# Patient Record
(demographics unavailable — no encounter records)

---

## 2024-10-07 NOTE — HISTORY OF PRESENT ILLNESS
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  See note from when she was seen in the office less than 3 months ago.  She deferred a cortisone injection.  She has been treated with occupational therapy.  She returns today for left thumb and hand pain that began after a fall about 6 weeks ago. She reports swelling. She rates her pain as a 7/10 which worsens with movement. She also needs an updated prescription for hand therapy.

## 2024-10-07 NOTE — PHYSICAL EXAM
[de-identified] : - Constitutional: This is a female in no obvious distress.   - Psych: Patient is alert and oriented to person, place and time.  Patient has a normal mood and affect.  ---   Examination of her left thumb demonstrates swelling along the palmar aspect of the CMC joint and the soft tissues.  There is some tenderness.  There is no crepitus.  She is neurovascularly intact distally. [de-identified] : AP, lateral, and oblique radiographs of the left hand demonstrate no obvious fractures or dislocations, mild to moderate degenerative changes at the CMC joint.   PA, lateral, and oblique radiographs of the left wrist and hand dated 7/1/2024 demonstrated mild degenerative changes at the CMC joint of the thumb  PA, lateral, and oblique radiographs of the right wrist and hand dated 11/13/2023 demonstrated mild mild narrowing of the CMC joint of the thumb. No arthritis in the other regions of the hand or wrist.  Previous PA, lateral and PA  views of her left wrist and hand demonstrated no arthritis.  She has neutral ulnar variance.  An MRI of her right wrist dated 2/12/2024 demonstrated: Degenerative changes throughout the TFCC, with suspected degenerative tear at the peripheral styloid attachment.  Mild to moderate degenerative changes at the DRUJ with small effusion.  Mild ECU tendinosis.  An MRI of her left wrist from 12/8/2020 demonstrated moderate tendinopathy and longitudinal split tearing of the extensor carpi ulnaris tendon with moderate tenosynovitis. Mild to moderate tendinopathy and longitudinal split tearing of the extensor pollicis brevis and abductor pollicis longus tendons. Degenerative tear of the central disc of the triangular fibrocartilage complex. Mild chondromalacia and marginal osteophytosis of the scaphotrapezoidal and first carpometacarpal joint spaces. Moderate-sized distal radioulnar joint effusion and mild effusion of the midcarpal and radiocarpal joint spaces.

## 2024-10-07 NOTE — DISCUSSION/SUMMARY
[FreeTextEntry1] : She has findings consistent with persistent left thumb swelling and mild pain along the palmar aspect of the CMC joint, status post the injury 6 weeks ago.  I had a discussion regarding today's visit, the diagnosis and treatment recommendations and options.  We also discussed changes since the last visit.  At this time, I recommended continued observation and hand therapy as long as it is helping her. An updated prescription for hand therapy was also provided today. She will follow-up as needed.  If her symptoms do not improve at the left thumb, then I would consider a cortisone injection in the future.  The patient has agreed to the above plan of management and has expressed full understanding.  All questions were fully answered to the patient's satisfaction.  My cumulative time spent on today's visit was greater than 30 minutes and included: Preparation for the visit, review of the medical records, review of pertinent diagnostic studies, examination and counseling of the patient on the above diagnosis, treatment plan and prognosis, orders of diagnostic tests, medications and/or appropriate procedures and documentation in the medical records of today's visit.

## 2024-10-07 NOTE — ADDENDUM
[FreeTextEntry1] :  I, Michael Baker, acted solely as a scribe for Dr. Roa on this date on 10/07/2024.

## 2024-10-07 NOTE — END OF VISIT
[FreeTextEntry3] : This note was written by Michael Baker on 10/07/2024 acting solely as a scribe for Dr. Paulo Roa.   All medical record entries made by the Scribe were at my, Dr. Paulo Roa, direction and personally dictated by me on 10/07/2024. I have personally reviewed the chart and agree that the record accurately reflects my personal performance of the history, physical exam, assessment and plan.

## 2024-10-07 NOTE — PHYSICAL EXAM
[de-identified] : - Constitutional: This is a female in no obvious distress.   - Psych: Patient is alert and oriented to person, place and time.  Patient has a normal mood and affect.  ---   Examination of her left thumb demonstrates swelling along the palmar aspect of the CMC joint and the soft tissues.  There is some tenderness.  There is no crepitus.  She is neurovascularly intact distally. [de-identified] : AP, lateral, and oblique radiographs of the left hand demonstrate no obvious fractures or dislocations, mild to moderate degenerative changes at the CMC joint.   PA, lateral, and oblique radiographs of the left wrist and hand dated 7/1/2024 demonstrated mild degenerative changes at the CMC joint of the thumb  PA, lateral, and oblique radiographs of the right wrist and hand dated 11/13/2023 demonstrated mild mild narrowing of the CMC joint of the thumb. No arthritis in the other regions of the hand or wrist.  Previous PA, lateral and PA  views of her left wrist and hand demonstrated no arthritis.  She has neutral ulnar variance.  An MRI of her right wrist dated 2/12/2024 demonstrated: Degenerative changes throughout the TFCC, with suspected degenerative tear at the peripheral styloid attachment.  Mild to moderate degenerative changes at the DRUJ with small effusion.  Mild ECU tendinosis.  An MRI of her left wrist from 12/8/2020 demonstrated moderate tendinopathy and longitudinal split tearing of the extensor carpi ulnaris tendon with moderate tenosynovitis. Mild to moderate tendinopathy and longitudinal split tearing of the extensor pollicis brevis and abductor pollicis longus tendons. Degenerative tear of the central disc of the triangular fibrocartilage complex. Mild chondromalacia and marginal osteophytosis of the scaphotrapezoidal and first carpometacarpal joint spaces. Moderate-sized distal radioulnar joint effusion and mild effusion of the midcarpal and radiocarpal joint spaces.

## 2024-11-22 NOTE — HISTORY OF PRESENT ILLNESS
[Worsening] : worsening [___ mths] : [unfilled] month(s) ago [1] : a current pain level of 1/10 [0] : a minimum pain level of 0/10 [10] : a maximum pain level of 10/10 [Walking] : worsened by walking [Weight Bearing] : worsened by weight bearing [Physical Therapy] : relieved by physical therapy [Rest] : relieved by rest [Ataxia] : ataxia [Loss of Dexterity] : loss of dexterity [de-identified] : Very pleasant woman presents for evaluation of lower extremity weakness and fatigue she states she notices onset in August states she was on a 23 mile hike and and towards the end was unable to ambulate this lasted for approximately 12 hours.  In a day after extremely fatigued and ever since then she notices very early onset leg fatigue off balance weakness excetra.  Based upon progressive deficits with strength off balance MD minus a KRYSTIAN questionnaire that is positive and concern for myelopathy since August she has been under the care of her physical therapist they are noticing progressive weakness in her legs failure to recovery from training and physical therapy she was sent here for further evaluation for lumbar and/or cervical stenosis [Incontinence] : no incontinence [Urinary Ret.] : no urinary retention

## 2024-11-22 NOTE — DISCUSSION/SUMMARY
[de-identified] : Patient recently did a 23 mile hike what appears to have severe muscle fatigue.  However she is not recovering and this was back in August.  She states she has severe leg weakness off balance and I am concern for myelopathy.  She has been evaluated by her physical therapist previous orthopedics and she is sent to spine surgery for further workup for severe lumbar stenosis with regard to lower extremity weakness offPossible cervical stenosis balance.  Again concern for myelopathy based upon HPI physical exam excetra.  Patient has been in physical therapy since August with failure to improve with regard to leg strength off balance early fatigue excetra.  At this time the patient has exhausted conservative treatment including physical therapy and home exercises as guided by our office. Home exercises have been ongoing for the last 4-6 weeks, consisting of exercises performed for 30 min per day, for 4-5 days per week. These exercises have included cervical spine stretches, isometric strengthening exercises, chin tucks, shoulder circles, shoulder-blade squeezes, etc. which have failed to provide any significant relief in symptoms. They have also failed oral medications including NSAIDs/Tylenol, Medrol dosepacks, etc. again with failure to improve symptoms. At this time, an MRI of the cervical spine is ordered and medically necessary to evaluate for discogenic pathology that may be amendable to injection therapies with pain management vs. necessitate operative management. Patient will follow up after MRI to review results and discuss treatment options.   11/22/2024: Addendum-based upon the patient's positive KRYSTIAN status of gait alteration, falls, worsening dexterity/penmanship, abnormal reflexes and/or hyperreflexia as reflected in the HPI and physical exam an MRI of the cervical and thoracic spine is being ordered. MRI of the cervical and thoracic spine is ordered and medically necessary to evaluate for myelopathy that may necessitate a semi-urgent decompression to preserve neurological function and arrest neurological deficits. The patient will follow up ASAP after MRI to review results and discussion treatment options.  MRI of the lumbar spine is ordered to rule out spinal stenosis/neurogenic claudication as an alternative cause for lower extremity weakness/fatigue.  At this time the patient has exhausted conservative treatment including physical therapy and home exercises as guided by our office. Home exercises have been ongoing for the last 4-6 weeks, consisting of exercises performed for 30 min per day, for 4-5 days per week. These exercises have included posterior pelvic tilts, transverse abdominus presses with and without hip flexion/heel taps, dead bug, bird dog, plank, side plank, cat/camel, curl up, hip rolls, bridges, hamstring stretches, wall slides, press ups, etc. which have failed to provide any significant relief in symptoms. They have also failed oral medications including NSAIDs/Tylenol, Medrol dosepacks, etc. again with failure to improve symptoms. At this time, an MRI of the lumbar spine is ordered and medically necessary to evaluate for discogenic pathology that may be amendable to injection therapies with pain management vs. necessitate operative management. Patient will follow up after MRI to review results and discuss treatment options.

## 2024-11-22 NOTE — PHYSICAL EXAM
[Antalgic] : antalgic [Wide-Based] : wide-based [de-identified] : CONSTITUTIONAL: The patient is a very pleasant individual who is well-nourished and who appears stated age. PSYCHIATRIC: The patient is alert and oriented X 3 and in no apparent distress, and participates with orthopedic evaluation well. HEAD: Atraumatic and is nonsyndromic in appearance. EENT: No visible thyromegaly, EOMI. RESPIRATORY: Respiratory rate is regular, not dyspneic on examination. LYMPHATICS: There is no inguinal lymphadenopathy INTEGUMENTARY: Skin is clean, dry, and intact about the bilateral lower extremities and lumbar spine. VASCULAR: There is brisk capillary refill about the bilateral lower extremities. NEUROLOGIC: There are no pathologic reflexes. There is a decrease in sensation of the bilateral lower extremities on manual  examination. Deep tendon reflexes are well maintained at 2+/4 of the bilateral lower extremities and are symmetric.. MUSCULOSKELETAL: There is no visible muscular atrophy. Manual motor strength is diminished in the bilateral lower extremities,  subjectively patient states her motor strength is much diminished at approximately a 4/5. Range of motion of lumbar spine is well maintained guarded. The patient ambulates in a non-myelopathic manner. Negative tension sign and straight leg raise bilaterally. Quad extension, ankle dorsiflexion, EHL, plantar flexion, and ankle eversion are well preserved. Normal secondary orthopaedic exam of bilateral hips, greater trochanteric area, knees and ankles [de-identified] : Previous lumbar x-rays have been reviewed that shows a transitional segment significant calcified uterine fibroma.  Lumbar spondylosis is noted.  Previous MRI is also been reviewed the lumbar spine from 2021 showing lateral disc herniation on the left at L3-4 mild to moderate stenosis at 4 5.  Hip MRIs previously also been reviewed showing gluteal tendinopathy

## 2024-11-22 NOTE — DISCUSSION/SUMMARY
[de-identified] : Patient recently did a 23 mile hike what appears to have severe muscle fatigue.  However she is not recovering and this was back in August.  She states she has severe leg weakness off balance and I am concern for myelopathy.  She has been evaluated by her physical therapist previous orthopedics and she is sent to spine surgery for further workup for severe lumbar stenosis with regard to lower extremity weakness offPossible cervical stenosis balance.  Again concern for myelopathy based upon HPI physical exam excetra.  Patient has been in physical therapy since August with failure to improve with regard to leg strength off balance early fatigue excetra.  At this time the patient has exhausted conservative treatment including physical therapy and home exercises as guided by our office. Home exercises have been ongoing for the last 4-6 weeks, consisting of exercises performed for 30 min per day, for 4-5 days per week. These exercises have included cervical spine stretches, isometric strengthening exercises, chin tucks, shoulder circles, shoulder-blade squeezes, etc. which have failed to provide any significant relief in symptoms. They have also failed oral medications including NSAIDs/Tylenol, Medrol dosepacks, etc. again with failure to improve symptoms. At this time, an MRI of the cervical spine is ordered and medically necessary to evaluate for discogenic pathology that may be amendable to injection therapies with pain management vs. necessitate operative management. Patient will follow up after MRI to review results and discuss treatment options.   11/22/2024: Addendum-based upon the patient's positive KRYSTIAN status of gait alteration, falls, worsening dexterity/penmanship, abnormal reflexes and/or hyperreflexia as reflected in the HPI and physical exam an MRI of the cervical and thoracic spine is being ordered. MRI of the cervical and thoracic spine is ordered and medically necessary to evaluate for myelopathy that may necessitate a semi-urgent decompression to preserve neurological function and arrest neurological deficits. The patient will follow up ASAP after MRI to review results and discussion treatment options.  MRI of the lumbar spine is ordered to rule out spinal stenosis/neurogenic claudication as an alternative cause for lower extremity weakness/fatigue.  At this time the patient has exhausted conservative treatment including physical therapy and home exercises as guided by our office. Home exercises have been ongoing for the last 4-6 weeks, consisting of exercises performed for 30 min per day, for 4-5 days per week. These exercises have included posterior pelvic tilts, transverse abdominus presses with and without hip flexion/heel taps, dead bug, bird dog, plank, side plank, cat/camel, curl up, hip rolls, bridges, hamstring stretches, wall slides, press ups, etc. which have failed to provide any significant relief in symptoms. They have also failed oral medications including NSAIDs/Tylenol, Medrol dosepacks, etc. again with failure to improve symptoms. At this time, an MRI of the lumbar spine is ordered and medically necessary to evaluate for discogenic pathology that may be amendable to injection therapies with pain management vs. necessitate operative management. Patient will follow up after MRI to review results and discuss treatment options.

## 2024-11-22 NOTE — PHYSICAL EXAM
[Antalgic] : antalgic [Wide-Based] : wide-based [de-identified] : CONSTITUTIONAL: The patient is a very pleasant individual who is well-nourished and who appears stated age. PSYCHIATRIC: The patient is alert and oriented X 3 and in no apparent distress, and participates with orthopedic evaluation well. HEAD: Atraumatic and is nonsyndromic in appearance. EENT: No visible thyromegaly, EOMI. RESPIRATORY: Respiratory rate is regular, not dyspneic on examination. LYMPHATICS: There is no inguinal lymphadenopathy INTEGUMENTARY: Skin is clean, dry, and intact about the bilateral lower extremities and lumbar spine. VASCULAR: There is brisk capillary refill about the bilateral lower extremities. NEUROLOGIC: There are no pathologic reflexes. There is a decrease in sensation of the bilateral lower extremities on manual  examination. Deep tendon reflexes are well maintained at 2+/4 of the bilateral lower extremities and are symmetric.. MUSCULOSKELETAL: There is no visible muscular atrophy. Manual motor strength is diminished in the bilateral lower extremities,  subjectively patient states her motor strength is much diminished at approximately a 4/5. Range of motion of lumbar spine is well maintained guarded. The patient ambulates in a non-myelopathic manner. Negative tension sign and straight leg raise bilaterally. Quad extension, ankle dorsiflexion, EHL, plantar flexion, and ankle eversion are well preserved. Normal secondary orthopaedic exam of bilateral hips, greater trochanteric area, knees and ankles [de-identified] : Previous lumbar x-rays have been reviewed that shows a transitional segment significant calcified uterine fibroma.  Lumbar spondylosis is noted.  Previous MRI is also been reviewed the lumbar spine from 2021 showing lateral disc herniation on the left at L3-4 mild to moderate stenosis at 4 5.  Hip MRIs previously also been reviewed showing gluteal tendinopathy

## 2024-11-22 NOTE — HISTORY OF PRESENT ILLNESS
[Worsening] : worsening [___ mths] : [unfilled] month(s) ago [1] : a current pain level of 1/10 [0] : a minimum pain level of 0/10 [10] : a maximum pain level of 10/10 [Walking] : worsened by walking [Weight Bearing] : worsened by weight bearing [Physical Therapy] : relieved by physical therapy [Rest] : relieved by rest [Ataxia] : ataxia [Loss of Dexterity] : loss of dexterity [de-identified] : Very pleasant woman presents for evaluation of lower extremity weakness and fatigue she states she notices onset in August states she was on a 23 mile hike and and towards the end was unable to ambulate this lasted for approximately 12 hours.  In a day after extremely fatigued and ever since then she notices very early onset leg fatigue off balance weakness excetra.  Based upon progressive deficits with strength off balance MD minus a KRYSTIAN questionnaire that is positive and concern for myelopathy since August she has been under the care of her physical therapist they are noticing progressive weakness in her legs failure to recovery from training and physical therapy she was sent here for further evaluation for lumbar and/or cervical stenosis [Incontinence] : no incontinence [Urinary Ret.] : no urinary retention

## 2024-12-26 NOTE — HISTORY OF PRESENT ILLNESS
[Stable] : stable [___ yrs] : [unfilled] year(s) ago [0] : a minimum pain level of 0/10 [5] : a maximum pain level of 5/10 [Intermit.] : ~He/She~ states the symptoms seem to be intermittent [Walking] : worsened by walking [Heat] : relieved by heat [Physical Therapy] : relieved by physical therapy [Rest] : relieved by rest [de-identified] : Patient presents for review of her cervical MRI.  She is also under the care of of neurology.  Neurology has MRIs pending thoracic and lumbar have been denied from her insurance carrier despite a request for workup with regard to rule out myelopathy cervical MRI is complete in summary demonstrating cervical spondylosis at 3 levels of 4556 and 6 7 [Ataxia] : no ataxia [Incontinence] : no incontinence [Loss of Dexterity] : good dexterity [Urinary Ret.] : no urinary retention

## 2024-12-26 NOTE — PHYSICAL EXAM
[Antalgic] : antalgic [de-identified] : CONSTITUTIONAL: The patient is a very pleasant individual who is well-nourished and who appears stated age. PSYCHIATRIC: The patient is alert and oriented X 3 and in no apparent distress, and participates with orthopedic evaluation well. HEAD: Atraumatic and is nonsyndromic in appearance. EENT: No visible thyromegaly, EOMI. RESPIRATORY: Respiratory rate is regular, not dyspneic on examination. LYMPHATICS: There is no inguinal lymphadenopathy INTEGUMENTARY: Skin is clean, dry, and intact about the bilateral lower extremities and lumbar spine. VASCULAR: There is brisk capillary refill about the bilateral lower extremities. NEUROLOGIC: There are no pathologic reflexes. There is no decrease in sensation of the bilateral lower extremities on manual  examination. Deep tendon reflexes are well maintained at 2+/4 of the bilateral lower extremities and are symmetric.. MUSCULOSKELETAL: There is no visible muscular atrophy. Manual motor strength is well maintained in the bilateral lower extremities. Range of motion of lumbar spine is well maintained. The patient ambulates in a non-myelopathic manner. Negative tension sign and straight leg raise bilaterally. Quad extension, ankle dorsiflexion, EHL, plantar flexion, and ankle eversion are well preserved. Normal secondary orthopaedic exam of bilateral hips, greater trochanteric area, knees and ankles, highlighted by posterior cervical myositis and lumbar myositis [de-identified] : MRI of the cervical spine is demonstrating 4556 and 6 7 cervical spondylosis.  Pelvic MRI/right hip MRIs demonstrating tendinitis/tendinosis.  Previous lumbar MRI from 2021 has been reviewed showing transitional segment possibly an L4-L5 lumbar degenerative disc disease no significant central lateral recess stenosis

## 2024-12-26 NOTE — DISCUSSION/SUMMARY
[de-identified] : Under the care of neurology/[facility] they stated getting a lumbar & ThoracicMRI.  Our previous request for MRIs been denied for thoracic and lumbar for surgical indications I be happy to see this patient back for further discussion if surgical indications arise after neurology obtained thoracic and lumbar MRIs.  As of right now if cervical spondylosis is noted she has no myelopathy no focal radiculopathy in the cervical spine follow-up on a as needed basis

## 2025-01-30 NOTE — PHYSICAL EXAM
[Antalgic] : antalgic [de-identified] : CONSTITUTIONAL: The patient is a very pleasant individual who is well-nourished and who appears stated age. PSYCHIATRIC: The patient is alert and oriented X 3 and in no apparent distress, and participates with orthopedic evaluation well. HEAD: Atraumatic and is nonsyndromic in appearance. EENT: No visible thyromegaly, EOMI. RESPIRATORY: Respiratory rate is regular, not dyspneic on examination. LYMPHATICS: There is no inguinal lymphadenopathy INTEGUMENTARY: Skin is clean, dry, and intact about the bilateral lower extremities and lumbar spine. VASCULAR: There is brisk capillary refill about the bilateral lower extremities. NEUROLOGIC: There are no pathologic reflexes. There is no decrease in sensation of the bilateral lower extremities on manual  examination. Deep tendon reflexes are well maintained at 2+/4 of the bilateral lower extremities and are symmetric.. MUSCULOSKELETAL: There is no visible muscular atrophy. Manual motor strength is well maintained in the bilateral lower extremities. Range of motion of lumbar spine is well maintained. The patient ambulates in a non-myelopathic manner. Negative tension sign and straight leg raise bilaterally. Quad extension, ankle dorsiflexion, EHL, plantar flexion, and ankle eversion are well preserved. Normal secondary orthopaedic exam of bilateral hips, greater trochanteric area, knees and ankles, multiple complaints of right sided IT band type pathology complaints of fatigue in her legs glue medius excetra [de-identified] : MRI of the lumbar spine has been [facility] this is dated January 23, 2025 overall minimal lumbar spondylosis adjacent segment disease is noted retrolisthesis L5-S1 no significant central lateral recess stenosis there is L5-S1 lumbar degenerative disc disease.  Thoracic MRI is currently pending and being ordered by her neurologist

## 2025-01-30 NOTE — HISTORY OF PRESENT ILLNESS
[de-identified] : Damon woman presents for evaluation of this complaint of weakness that occurred over the summer.  She states this occurred after climbing multiple peaks/mountains 20+ miles.  She is currently being guided and treated by her neurologist to make sure this is not a central neurologic issue stenosis myelopathy excetra she has had cervical MRIs lumbar MRIs both show spondylosis but no severe stenosis.  Thoracic MRI is currently pending as per her and her neurology team [Stable] : stable [Walking] : worsened by walking [Exercise Regimen] : relieved by exercise regimen [Physical Therapy] : relieved by physical therapy [Ataxia] : no ataxia [Incontinence] : no incontinence [Urinary Ret.] : no urinary retention [Loss of Dexterity] : good dexterity [de-identified] : hiking

## 2025-01-30 NOTE — DISCUSSION/SUMMARY
[de-identified] : With review of her cervical and lumbar MRI there is no direct surgical indications at this point in time she has no mechanically orientated axial cervical and/or lumbar pain again her primary complaint is just fatigue that occurred in August after climbing mountains of 20 something miles.  I would not recommend spine surgery of the cervical and lumbar region at this point in time she can continue with physical therapy gait balance training core strengthening mobility training excetra.  Overall strength training she is currently under the care of neurology for workup for this lower extremity weakness she states that thoracic MRI is currently pending of surgical issues arise after the   Thoracic MRI is complete patient can follow-up for surgical input.

## 2025-04-21 NOTE — END OF VISIT
[FreeTextEntry3] : This note was written by Michael Baker on 04/21/2025 acting solely as a scribe for Dr. Paulo Roa.   All medical record entries made by the Scribe were at my, Dr. Paulo Roa, direction and personally dictated by me on 04/21/2025. I have personally reviewed the chart and agree that the record accurately reflects my personal performance of the history, physical exam, assessment and plan.

## 2025-04-21 NOTE — HISTORY OF PRESENT ILLNESS
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  See prior notes.  She has been treated with splinting and occupational therapy and has deferred a cortisone injection  She returns today for left thumb pain that she rates as a 7/10 at this time. She denies numbness, swelling, clicking, locking, or radiating pain.  She is in hand therapy.

## 2025-04-21 NOTE — PHYSICAL EXAM
[de-identified] : - Constitutional: This is a female in no obvious distress.    ---   Examination of her left thumb demonstrates minimal swelling along the palmar aspect of the CMC joint and the soft tissues.  There is tenderness along the CMC joint.  There is no crepitus.  She is neurovascularly intact distally. [de-identified] : AP, lateral, and oblique radiographs of the left hand dated 10/7/2024 demonstrated no obvious fractures or dislocations, mild to moderate degenerative changes at the CMC joint.   PA, lateral, and oblique radiographs of the right wrist and hand dated 11/13/2023 demonstrated mild mild narrowing of the CMC joint of the thumb. No arthritis in the other regions of the hand or wrist.  An MRI of her right wrist dated 2/12/2024 demonstrated: Degenerative changes throughout the TFCC, with suspected degenerative tear at the peripheral styloid attachment.  Mild to moderate degenerative changes at the DRUJ with small effusion.  Mild ECU tendinosis.  An MRI of her left wrist from 12/8/2020 demonstrated moderate tendinopathy and longitudinal split tearing of the extensor carpi ulnaris tendon with moderate tenosynovitis. Mild to moderate tendinopathy and longitudinal split tearing of the extensor pollicis brevis and abductor pollicis longus tendons. Degenerative tear of the central disc of the triangular fibrocartilage complex. Mild chondromalacia and marginal osteophytosis of the scaphotrapezoidal and first carpometacarpal joint spaces. Moderate-sized distal radioulnar joint effusion and mild effusion of the midcarpal and radiocarpal joint spaces.

## 2025-04-21 NOTE — PHYSICAL EXAM
[de-identified] : - Constitutional: This is a female in no obvious distress.    ---   Examination of her left thumb demonstrates minimal swelling along the palmar aspect of the CMC joint and the soft tissues.  There is tenderness along the CMC joint.  There is no crepitus.  She is neurovascularly intact distally. [de-identified] : AP, lateral, and oblique radiographs of the left hand dated 10/7/2024 demonstrated no obvious fractures or dislocations, mild to moderate degenerative changes at the CMC joint.   PA, lateral, and oblique radiographs of the right wrist and hand dated 11/13/2023 demonstrated mild mild narrowing of the CMC joint of the thumb. No arthritis in the other regions of the hand or wrist.  An MRI of her right wrist dated 2/12/2024 demonstrated: Degenerative changes throughout the TFCC, with suspected degenerative tear at the peripheral styloid attachment.  Mild to moderate degenerative changes at the DRUJ with small effusion.  Mild ECU tendinosis.  An MRI of her left wrist from 12/8/2020 demonstrated moderate tendinopathy and longitudinal split tearing of the extensor carpi ulnaris tendon with moderate tenosynovitis. Mild to moderate tendinopathy and longitudinal split tearing of the extensor pollicis brevis and abductor pollicis longus tendons. Degenerative tear of the central disc of the triangular fibrocartilage complex. Mild chondromalacia and marginal osteophytosis of the scaphotrapezoidal and first carpometacarpal joint spaces. Moderate-sized distal radioulnar joint effusion and mild effusion of the midcarpal and radiocarpal joint spaces.

## 2025-04-21 NOTE — DISCUSSION/SUMMARY
[FreeTextEntry1] : I had a discussion regarding today's visit, the diagnosis and treatment recommendations and options.  We also discussed changes since the last visit.  At this time, we discussed treatment options including continued hand therapy, a cortisone injection, or surgical management. She deferred a cortisone injection or surgical management and has opted to continue with hand therapy at this time. An updated referral was provided today. She requested an MRI of her left thumb.  A referral was provided today. She will follow-up after her MRI to discuss the results and further treatment recommendations.   The patient has agreed to the above plan of management and has expressed full understanding.  All questions were fully answered to the patient's satisfaction.  My cumulative time spent on today's visit included: Preparation for the visit, review of the medical records, review of pertinent diagnostic studies, examination and counseling of the patient on the above diagnosis, treatment plan and prognosis, orders of diagnostic tests, medications and/or appropriate procedures and documentation in the medical records of today's visit.

## 2025-04-21 NOTE — ADDENDUM
[FreeTextEntry1] :  I, Michael Baker, acted solely as a scribe for Dr. Roa on this date on 04/21/2025.

## 2025-04-24 NOTE — PHYSICAL EXAM
[Antalgic] : antalgic [de-identified] : CONSTITUTIONAL: The patient is a very pleasant individual who is well-nourished and who appears stated age. PSYCHIATRIC: The patient is alert and oriented X 3 and in no apparent distress, and participates with orthopedic evaluation well. HEAD: Atraumatic and is nonsyndromic in appearance. EENT: No visible thyromegaly, EOMI. RESPIRATORY: Respiratory rate is regular, not dyspneic on examination. LYMPHATICS: There is no inguinal lymphadenopathy INTEGUMENTARY: Skin is clean, dry, and intact about the bilateral lower extremities and lumbar spine. VASCULAR: There is brisk capillary refill about the bilateral lower extremities. NEUROLOGIC: There are no pathologic reflexes. There is a decrease in sensation of the right lower extremities on manual  examination with a pain characteristic. Deep tendon reflexes are well maintained at 2+/4 of the bilateral lower extremities and are symmetric.. MUSCULOSKELETAL: There is no visible muscular atrophy. Manual motor strength is well maintained in the bilateral lower extremities. Range of motion of lumbar spine is well maintained. The patient ambulates in a non-myelopathic manner. Negative tension sign and straight leg raise bilaterally. Quad extension, ankle dorsiflexion, EHL, plantar flexion, and ankle eversion are well preserved. Normal secondary orthopaedic exam of bilateral hips, greater trochanteric area, knees and ankles, isolated lateral right calf pain [de-identified] : /Transitional segmentLumbar MRI has been reviewed from January 2025 that shows what appears to be an adjacent segment as documented lumbar spondylosis most noted at L5-S1.  There is no significant central lateral recess or canal compromise.  Patient feels as if she has a impingement of a nerve.  Cervical MRI has been reviewed again showing 3 levels of cervical spondylosis at 4556 and 6 7.  No significant central lateral recess stenosis

## 2025-04-24 NOTE — DISCUSSION/SUMMARY
[de-identified] : Patient has been referred back to Dr. Abdalla/sports medicine for Related pain patient is requesting an MRI of the calf/lower leg which I think is better directed by sports medicine.  Physical therapy prescriptions for calf pain I think should also come from sports medicine.  There is no spine surgical indications at this point in time based upon my opinion and or secondary spine opinion from neurosurgery.  Patient does not wish for injection therapy.  Continuation of physical therapy under the guidance of sports medicine sports medicine to evaluate for muscle bone pathology even compartment syndrome.  Possible foot and ankle evaluation as well.  No surgical indications are indicated in the lumbar spine follow-up on an as-needed basis

## 2025-04-24 NOTE — PHYSICAL EXAM
[Antalgic] : antalgic [de-identified] : CONSTITUTIONAL: The patient is a very pleasant individual who is well-nourished and who appears stated age. PSYCHIATRIC: The patient is alert and oriented X 3 and in no apparent distress, and participates with orthopedic evaluation well. HEAD: Atraumatic and is nonsyndromic in appearance. EENT: No visible thyromegaly, EOMI. RESPIRATORY: Respiratory rate is regular, not dyspneic on examination. LYMPHATICS: There is no inguinal lymphadenopathy INTEGUMENTARY: Skin is clean, dry, and intact about the bilateral lower extremities and lumbar spine. VASCULAR: There is brisk capillary refill about the bilateral lower extremities. NEUROLOGIC: There are no pathologic reflexes. There is a decrease in sensation of the right lower extremities on manual  examination with a pain characteristic. Deep tendon reflexes are well maintained at 2+/4 of the bilateral lower extremities and are symmetric.. MUSCULOSKELETAL: There is no visible muscular atrophy. Manual motor strength is well maintained in the bilateral lower extremities. Range of motion of lumbar spine is well maintained. The patient ambulates in a non-myelopathic manner. Negative tension sign and straight leg raise bilaterally. Quad extension, ankle dorsiflexion, EHL, plantar flexion, and ankle eversion are well preserved. Normal secondary orthopaedic exam of bilateral hips, greater trochanteric area, knees and ankles, isolated lateral right calf pain [de-identified] : /Transitional segmentLumbar MRI has been reviewed from January 2025 that shows what appears to be an adjacent segment as documented lumbar spondylosis most noted at L5-S1.  There is no significant central lateral recess or canal compromise.  Patient feels as if she has a impingement of a nerve.  Cervical MRI has been reviewed again showing 3 levels of cervical spondylosis at 4556 and 6 7.  No significant central lateral recess stenosis

## 2025-04-24 NOTE — DISCUSSION/SUMMARY
[de-identified] : Patient has been referred back to Dr. Abdalla/sports medicine for Related pain patient is requesting an MRI of the calf/lower leg which I think is better directed by sports medicine.  Physical therapy prescriptions for calf pain I think should also come from sports medicine.  There is no spine surgical indications at this point in time based upon my opinion and or secondary spine opinion from neurosurgery.  Patient does not wish for injection therapy.  Continuation of physical therapy under the guidance of sports medicine sports medicine to evaluate for muscle bone pathology even compartment syndrome.  Possible foot and ankle evaluation as well.  No surgical indications are indicated in the lumbar spine follow-up on an as-needed basis

## 2025-04-24 NOTE — HISTORY OF PRESENT ILLNESS
[Worsening] : worsening [___ mths] : [unfilled] month(s) ago [de-identified] : Patient presents for secondary and tertiary opinion with regard to right calf pain she states her physical therapist looked at the MRI and feels that there is an impingement he disagreed with my opinion and patient was sent to neurosurgery neurosurgery says there could be a slight impingement and referred her for injection she does not want injections trigger point injections excetra.  She presents for me to reevaluate her lumbar MRI. [2] : a current pain level of 2/10 [1] : an average pain level of 1/10 [0] : a minimum pain level of 0/10 [10] : a maximum pain level of 10/10 [Walking] : walking [Physical Therapy] : relieved by physical therapy [Rest] : relieved by rest [Ataxia] : no ataxia [Incontinence] : no incontinence [Loss of Dexterity] : good dexterity [Urinary Ret.] : no urinary retention [de-identified] : hiking and walking

## 2025-04-24 NOTE — HISTORY OF PRESENT ILLNESS
[Worsening] : worsening [___ mths] : [unfilled] month(s) ago [de-identified] : Patient presents for secondary and tertiary opinion with regard to right calf pain she states her physical therapist looked at the MRI and feels that there is an impingement he disagreed with my opinion and patient was sent to neurosurgery neurosurgery says there could be a slight impingement and referred her for injection she does not want injections trigger point injections excetra.  She presents for me to reevaluate her lumbar MRI. [2] : a current pain level of 2/10 [1] : an average pain level of 1/10 [0] : a minimum pain level of 0/10 [10] : a maximum pain level of 10/10 [Walking] : walking [Physical Therapy] : relieved by physical therapy [Rest] : relieved by rest [Ataxia] : no ataxia [Incontinence] : no incontinence [Loss of Dexterity] : good dexterity [Urinary Ret.] : no urinary retention [de-identified] : hiking and walking